# Patient Record
Sex: MALE | Race: WHITE | NOT HISPANIC OR LATINO | URBAN - NONMETROPOLITAN AREA
[De-identification: names, ages, dates, MRNs, and addresses within clinical notes are randomized per-mention and may not be internally consistent; named-entity substitution may affect disease eponyms.]

---

## 2019-07-31 ENCOUNTER — IMPORTED ENCOUNTER (OUTPATIENT)
Dept: URBAN - NONMETROPOLITAN AREA CLINIC 1 | Facility: CLINIC | Age: 79
End: 2019-07-31

## 2019-07-31 PROBLEM — H25.813: Noted: 2019-07-31

## 2019-07-31 PROCEDURE — 92004 COMPRE OPH EXAM NEW PT 1/>: CPT

## 2019-07-31 NOTE — PATIENT DISCUSSION
Cataract(s)-Visually significant cataract OU .-Cataract(s) causing symptomatic impairment of visual function not correctable with a tolerable change in glasses or contact lenses lighting or non-operative means resulting in specific activity limitations and/or participation restrictions including but not limited to reading viewing television driving or meeting vocational or recreational needs. -Expectation is clearer vision and functional improvement in symptoms as well as reduced glare disability after cataract removal.-Order IOLMaster and OPD today. -Recommend Toric IOL   /   Limbal Relaxing Incisions based on today's OPD testing and lifestyle questionnaire.-All questions were answered regarding surgery including pre and post-op medications appointments activity restrictions and anesthetic usage.-The risks benefits and alternatives and special risk factors for the patient were discussed in detail including but not limited to: bleeding infection retinal detachment vitreous loss problems with the implant and possible need for additional surgery.-Although rare the possibility of complete vision loss was discussed.-The possible need for glasses post-operatively was discussed.-Order medical clearance exam based on history of  age of patient -Patient elects to proceed with cataract surgery OD . Will schedule at patient's convenience and re-evaluate OS  in the future. Discussed all lens options  in detail with patient he wishes to proceed with Standard IOL

## 2019-08-26 ENCOUNTER — IMPORTED ENCOUNTER (OUTPATIENT)
Dept: URBAN - NONMETROPOLITAN AREA CLINIC 1 | Facility: CLINIC | Age: 79
End: 2019-08-26

## 2019-08-26 PROBLEM — J43.9: Noted: 2019-08-26

## 2019-08-26 PROBLEM — K21.9: Noted: 2019-08-26

## 2019-08-26 PROBLEM — Z01.818: Noted: 2019-08-26

## 2019-08-26 PROCEDURE — 99214 OFFICE O/P EST MOD 30 MIN: CPT

## 2019-09-10 ENCOUNTER — IMPORTED ENCOUNTER (OUTPATIENT)
Dept: URBAN - NONMETROPOLITAN AREA CLINIC 1 | Facility: CLINIC | Age: 79
End: 2019-09-10

## 2019-09-10 PROCEDURE — 99024 POSTOP FOLLOW-UP VISIT: CPT

## 2019-09-10 PROCEDURE — 66984 XCAPSL CTRC RMVL W/O ECP: CPT

## 2019-09-10 PROCEDURE — 92136 OPHTHALMIC BIOMETRY: CPT

## 2019-09-10 NOTE — PATIENT DISCUSSION
Cataract(s)-Visually significant cataract OS . -Cataract(s) causing symptomatic impairment of visual function not correctable with a tolerable change in glasses or contact lenses lighting or non-operative means resulting in specific activity limitations and/or participation restrictions including but not limited to reading viewing television driving or meeting vocational or recreational needs. -Expectation is clearer vision and functional improvement in symptoms as well as reduced glare disability after cataract removal.-Recommend Standard based on previous OPD testing and lifestyle questionnaire.-All questions were answered regarding surgery including pre and post-op medications appointments activity restrictions and anesthetic usage.-The risks benefits and alternatives and special risk factors for the patient were discussed in detail including but not limited to: bleeding infection retinal detachment vitreous loss problems with the implant and possible need for additional surgery.-Although rare the possibility of complete vision loss was discussed.-The need for glasses post-operatively was discussed.-Patient elects to proceed with cataract surgery OS . Will schedule at patient's convenience. s/p PCIOL-Pt doing well s/p PCIOL. -2 gtts of Alphagan instilled in office.-Continue post-op gtts according to instruction sheet and sleep with eye shield over eye for 7 nights.-Avoid bending at the waist lifting anything over 5lbs and dirty or luis environments. Patient would like to talk to Dr. Sabillon's nurse early tomorrow morning about the ramifications of postponing his surgery.

## 2019-09-11 PROBLEM — Z98.41: Noted: 2019-09-11

## 2019-09-11 PROBLEM — H25.12: Noted: 2019-09-11

## 2019-09-18 ENCOUNTER — IMPORTED ENCOUNTER (OUTPATIENT)
Dept: URBAN - NONMETROPOLITAN AREA CLINIC 1 | Facility: CLINIC | Age: 79
End: 2019-09-18

## 2019-09-18 PROBLEM — Z98.42: Noted: 2019-09-18

## 2019-09-18 PROBLEM — Z98.41: Noted: 2019-09-11

## 2019-09-18 PROBLEM — H26.493: Noted: 2019-09-18

## 2019-09-25 ENCOUNTER — IMPORTED ENCOUNTER (OUTPATIENT)
Dept: URBAN - NONMETROPOLITAN AREA CLINIC 1 | Facility: CLINIC | Age: 79
End: 2019-09-25

## 2019-09-25 PROCEDURE — 99024 POSTOP FOLLOW-UP VISIT: CPT

## 2019-09-25 NOTE — PATIENT DISCUSSION
1 week POV CE OS 9/17/19 CE OD 9/10/19 Standard IOL OU- Discussed diagnosis in detail with patient- Patient is stable and doing well- Wound intact- Continue all post op drops as directed- PCO OU noted but stable and no treatment needed at this time - Continue to monitor- Patient happy with current glasses/OTC readers and says these are working well. Asked him to bring glasses with him to his next visit so we can check [facility] with them. - RTC 3 months w/ BAT DFE

## 2020-11-13 NOTE — PATIENT DISCUSSION
1 Day POV CE OS 9/17/19 CE OD 9/10/19 Standard IOL OU- Discussed diagnosis in detail with patient- Patient is stable and doing well- Wound intact- Continue all post op drops as directed- PCO OU noted but stable and no treatment needed at this time - Continue to monitor- RTC 1 week POV Elbow pain

## 2022-04-15 ASSESSMENT — VISUAL ACUITY
OD_CC: 20/30
OS_GLARE: 20/40
OD_CC: 20/40+
OS_GLARE: 20/70
OS_CC: 20/50+
OS_AM: 20/20
OS_CC: 20/40-
OS_CC: 20/40-
OD_PH: 20/30-
OD_PAM: 20/25
OU_CC: 20/25
OS_AM: 20/20
OD_GLARE: 20/50
OD_GLARE: 20/50
OS_GLARE: 20/40
OD_CC: 20/70
OS_PH: 20/25
OS_PH: 20/40
OS_PH: 20/30

## 2022-04-15 ASSESSMENT — TONOMETRY
OD_IOP_MMHG: 15
OS_IOP_MMHG: 15
OD_IOP_MMHG: 15
OD_IOP_MMHG: 18
OD_IOP_MMHG: 28
OS_IOP_MMHG: 22
OS_IOP_MMHG: 19
OS_IOP_MMHG: 17

## 2023-08-02 NOTE — PATIENT DISCUSSION
Recommend OBSERVATION and continued MONITORING for progression. Graft Donor Site Dermal Sutures (Optional): 5-0 Vicryl